# Patient Record
Sex: FEMALE | Race: BLACK OR AFRICAN AMERICAN | NOT HISPANIC OR LATINO | Employment: UNEMPLOYED | URBAN - METROPOLITAN AREA
[De-identification: names, ages, dates, MRNs, and addresses within clinical notes are randomized per-mention and may not be internally consistent; named-entity substitution may affect disease eponyms.]

---

## 2023-04-20 ENCOUNTER — HOSPITAL ENCOUNTER (OUTPATIENT)
Dept: DATA CONVERSION | Facility: HOSPITAL | Age: 28
End: 2023-04-20
Attending: ORTHOPAEDIC SURGERY | Admitting: ORTHOPAEDIC SURGERY

## 2023-04-20 DIAGNOSIS — Z79.82 LONG TERM (CURRENT) USE OF ASPIRIN: ICD-10-CM

## 2023-04-20 DIAGNOSIS — D64.9 ANEMIA, UNSPECIFIED: ICD-10-CM

## 2023-04-20 DIAGNOSIS — T84.84XA PAIN DUE TO INTERNAL ORTHOPEDIC PROSTHETIC DEVICES, IMPLANTS AND GRAFTS, INITIAL ENCOUNTER (CMS-HCC): ICD-10-CM

## 2023-09-07 VITALS — WEIGHT: 199.3 LBS | HEIGHT: 66 IN | BODY MASS INDEX: 32.03 KG/M2

## 2023-09-14 NOTE — H&P
History & Physical Reviewed:   Pregnant/Lactating:  ·  Are You Pregnant no   ·  Are You Currently Breastfeeding no     I have reviewed the History and Physical dated:  05-Apr-2023   History and Physical reviewed and relevant findings noted. Patient examined to review pertinent physical  findings.: No significant changes   Home Medications Reviewed: no changes noted   Allergies Reviewed: no changes noted       ERAS (Enhanced Recovery After Surgery):  ·  ERAS Patient: no     Consent:   COVID-19 Consent:  ·  COVID-19 Risk Consent Surgeon has reviewed key risks related to the risk of ailyn COVID-19 and if they contract COVID-19 what the risks are.     Attestation:   Note Completion:  I am a:  Resident/Fellow   Attending Attestation I saw and evaluated the patient.  I personally obtained the key and critical portions of the history and physical exam or was physically present for key and  critical portions performed by the resident/fellow. I reviewed the resident/fellow?s documentation and discussed the patient with the resident/fellow.  I agree with the resident/fellow?s medical decision making as documented in the note.     I personally evaluated the patient on 20-Apr-2023         Electronic Signatures:  Henry Babcock)  (Signed 20-Apr-2023 08:42)   Authored: Note Completion   Co-Signer: History & Physical Reviewed, ERAS, Consent, Note Completion  Dwayne Bahena (Resident))  (Signed 20-Apr-2023 04:44)   Authored: History & Physical Reviewed, ERAS, Consent,  Note Completion      Last Updated: 20-Apr-2023 08:42 by Henry Babcock)

## 2023-10-02 NOTE — OP NOTE
Post Operative Note:     PreOp Diagnosis: R ankle failed hardware   Post-Procedure Diagnosis: R ankle failed hardware   Procedure: 1. Removal of hardware, R ankle tibia  removal of hardware right fibula  stress view   Surgeon: Yoko   Resident/Fellow/Other Assistant: Romero   Estimated Blood Loss (mL): 5   Specimen: no   Complications: None   Findings: c/w diagnosis   Patient Returned To/Condition: PACU in stable condition   Drains and/or Catheters: None   Additional Details: Plan:  - anticipated d/c from PACU  - WBAT RLE  - ASA 81mg BID   - PO analgesia  - F/U Dr. Babcock in 3 weeks     Attestation:   Note Completion:  I am a: Resident/Fellow   Attending Attestation I was present for key portions of the procedure and the procedure lasted longer than 5 minutes.          Electronic Signatures:  Shai Jasso (MD (Resident))  (Signed 20-Apr-2023 08:48)   Authored: Post Operative Note, Note Completion  Henry Babcock)  (Signed 20-Apr-2023 15:16)   Authored: Post Operative Note, Note Completion   Co-Signer: Post Operative Note, Note Completion      Last Updated: 20-Apr-2023 15:16 by Henry Babcock)

## 2024-04-19 NOTE — OP NOTE
PREOPERATIVE DIAGNOSIS:  1. Right ankle painful orthopedic hardware.  2. Previous tibial shaft fracture and ankle fracture.    POSTOPERATIVE DIAGNOSIS:  1. Right ankle painful orthopedic hardware.  2. Previous tibial shaft fracture and ankle fracture.    OPERATION/PROCEDURE:  1. Removal of hardware, right fibula.  2. Removal of hardware, right tibia.  3. Stress view under anesthesia.    SURGEON:  Henry Babcock MD.    ASSISTANT(S):  Shai Jasso, PGY-3    ANESTHESIA:  General.    INDICATIONS:  The patient is a 27-year-old female, who had a tibia IM nail, fibula  IM nail, and syndesmotic fixation as well as percutaneous fixation of  the tibial plafond.  Her hardware was symptomatic and as well as  creating some cystic changes in her bone.  Additionally, her  syndesmotic screw was broken.  Risks and benefits of surgical versus  nonsurgical management were explained to the patient and surgical  intervention was agreed upon.  Informed consent was obtained.     OPERATION IN DETAIL:  The patient was brought to the operating room.  A time-out was  performed.  The patient was placed under general anesthesia.  The  patient was given preoperative antibiotics and TXA.  The patient was  prepped and draped in usual sterile fashion.  A pre-incision pause  occurred.     We inflated the tourniquet to 300 mmHg and this was taken down prior  to 2 hours.  We started by making a percutaneous incision over the  fibula over the syndesmotic screw and this was broken.  We were able  to place a 2.5 hex screwdriver in this and backed this out a few  threads before it had no purchase and we were able to use a tonsil to  grab into the head and pull the screw out.  We then turned our  attention to the fibular wire.  This was a Synthes __________ flexi  nail.  A small incision was made distal to the fibula and then we were able  to use a heavy needle  to grab onto the end and were able to  wiggle this out.  This actually  came out very smoothly.  I also  noticed that 1 of the medial screws was backing out a little bit and  eroding in from the tibia into the fibula, so decision was made to  make a medial incision, removed this 1 screw from the tibia.  We made  a 2 cm incision and then a portion of the screw was completely  stripped and then we were able to get a heavy needle  around  it, and as we were twisting at the head and proximal part of the  screw broke off, so all of the screw was inside the tibia.  None of  it was in the syndesmosis at this point, which I was very happy with,  so this was all capsulated in the tibia.  We were able to remove this  small piece that was prominent on the tibia side.  I then took AP and  lateral x-rays.  I did a stress view under anesthesia showing no  medial clear space widening.  We then copiously irrigated.  2-0  Monocryl was used to close subcutaneous tissue and 2-0 nylon was used  to close the skin.  Xeroform, fluffs, Webril, and soft dressing were  placed.     POSTOPERATIVE PLAN:  The patient will be weightbearing as tolerated to right lower  extremity.  She will be on aspirin for DVT prophylaxis.  She will  follow up with me in 3 weeks with 3 views of the right ankle and 2  views of the right tibia.     I was present for all critical portions of this case.      Henry Babcock MD    DD:  04/20/2023 08:39:39 EST  DT:  04/20/2023 11:24:57 EST  DICTATION NUMBER:  [id number]  INTERNAL JOB NUMBER:  [id number]    CC:  Henry Babcock MD, Fax: [phone]       Electronic Signatures:  Henry Babcock) (Signed on 20-Apr-2023 15:15)   Authored   Unsigned, Draft (SYS GENERATED) (Entered on 20-Apr-2023 11:24)   Entered     Last Updated: 20-Apr-2023 15:15 by Henry Babcock)